# Patient Record
Sex: FEMALE | Race: WHITE | NOT HISPANIC OR LATINO | URBAN - METROPOLITAN AREA
[De-identification: names, ages, dates, MRNs, and addresses within clinical notes are randomized per-mention and may not be internally consistent; named-entity substitution may affect disease eponyms.]

---

## 2020-12-02 ENCOUNTER — HOSPITAL ENCOUNTER (OUTPATIENT)
Dept: URGENT CARE | Facility: CLINIC | Age: 35
Discharge: HOME | End: 2020-12-02
Attending: FAMILY MEDICINE

## 2020-12-04 LAB — BACTERIA UR CULT: NORMAL

## 2021-05-31 ENCOUNTER — HOSPITAL ENCOUNTER (OUTPATIENT)
Dept: URGENT CARE | Facility: CLINIC | Age: 36
Discharge: HOME | End: 2021-05-31
Attending: FAMILY MEDICINE

## 2021-09-13 ENCOUNTER — LAB (OUTPATIENT)
Dept: LAB | Facility: HOSPITAL | Age: 36
End: 2021-09-13

## 2021-09-13 ENCOUNTER — TRANSCRIBE ORDERS (OUTPATIENT)
Dept: LAB | Facility: HOSPITAL | Age: 36
End: 2021-09-13

## 2021-09-13 DIAGNOSIS — Z20.822 COVID-19 RULED OUT: ICD-10-CM

## 2021-09-13 DIAGNOSIS — Z20.822 COVID-19 RULED OUT: Primary | ICD-10-CM

## 2021-09-13 PROCEDURE — C9803 HOPD COVID-19 SPEC COLLECT: HCPCS

## 2021-09-13 PROCEDURE — U0004 COV-19 TEST NON-CDC HGH THRU: HCPCS

## 2021-09-14 LAB — SARS-COV-2 RNA NOSE QL NAA+PROBE: DETECTED

## 2021-09-27 ENCOUNTER — OFFICE VISIT (OUTPATIENT)
Dept: FAMILY MEDICINE CLINIC | Facility: CLINIC | Age: 36
End: 2021-09-27

## 2021-09-27 VITALS
HEART RATE: 80 BPM | SYSTOLIC BLOOD PRESSURE: 122 MMHG | HEIGHT: 66 IN | OXYGEN SATURATION: 100 % | DIASTOLIC BLOOD PRESSURE: 74 MMHG | BODY MASS INDEX: 33.91 KG/M2 | WEIGHT: 211 LBS

## 2021-09-27 DIAGNOSIS — L81.9 PIGMENTED SKIN LESIONS: ICD-10-CM

## 2021-09-27 DIAGNOSIS — R06.02 SHORTNESS OF BREATH: ICD-10-CM

## 2021-09-27 DIAGNOSIS — R05.9 COUGH: ICD-10-CM

## 2021-09-27 DIAGNOSIS — R07.89 OTHER CHEST PAIN: Primary | ICD-10-CM

## 2021-09-27 DIAGNOSIS — U07.1 COVID-19 VIRUS INFECTION: ICD-10-CM

## 2021-09-27 DIAGNOSIS — J18.9 COMMUNITY ACQUIRED PNEUMONIA OF RIGHT LOWER LOBE OF LUNG: ICD-10-CM

## 2021-09-27 PROCEDURE — 99204 OFFICE O/P NEW MOD 45 MIN: CPT | Performed by: FAMILY MEDICINE

## 2021-09-27 PROCEDURE — 93000 ELECTROCARDIOGRAM COMPLETE: CPT | Performed by: FAMILY MEDICINE

## 2021-09-27 RX ORDER — AZITHROMYCIN 250 MG/1
TABLET, FILM COATED ORAL
Qty: 6 TABLET | Refills: 0 | Status: SHIPPED | OUTPATIENT
Start: 2021-09-27 | End: 2021-10-04 | Stop reason: SDUPTHER

## 2021-09-27 RX ORDER — DEXAMETHASONE 4 MG/1
4 TABLET ORAL 2 TIMES DAILY WITH MEALS
Qty: 14 TABLET | Refills: 0 | Status: SHIPPED | OUTPATIENT
Start: 2021-09-27 | End: 2021-10-04 | Stop reason: SDUPTHER

## 2021-09-28 ENCOUNTER — PATIENT ROUNDING (BHMG ONLY) (OUTPATIENT)
Dept: FAMILY MEDICINE CLINIC | Facility: CLINIC | Age: 36
End: 2021-09-28

## 2021-09-28 LAB
ALBUMIN SERPL-MCNC: 4.2 G/DL (ref 3.8–4.8)
ALBUMIN/GLOB SERPL: 1.5 {RATIO} (ref 1.2–2.2)
ALP SERPL-CCNC: 62 IU/L (ref 44–121)
ALT SERPL-CCNC: 7 IU/L (ref 0–32)
AST SERPL-CCNC: 18 IU/L (ref 0–40)
BASOPHILS # BLD AUTO: 0 X10E3/UL (ref 0–0.2)
BASOPHILS NFR BLD AUTO: 1 %
BILIRUB SERPL-MCNC: 0.3 MG/DL (ref 0–1.2)
BUN SERPL-MCNC: 10 MG/DL (ref 6–20)
BUN/CREAT SERPL: 12 (ref 9–23)
CALCIUM SERPL-MCNC: 8.9 MG/DL (ref 8.7–10.2)
CHLORIDE SERPL-SCNC: 103 MMOL/L (ref 96–106)
CO2 SERPL-SCNC: 27 MMOL/L (ref 20–29)
CREAT SERPL-MCNC: 0.85 MG/DL (ref 0.57–1)
CRP SERPL-MCNC: <1 MG/L (ref 0–10)
D DIMER PPP FEU-MCNC: 0.28 MG/L FEU (ref 0–0.49)
EOSINOPHIL # BLD AUTO: 0.1 X10E3/UL (ref 0–0.4)
EOSINOPHIL NFR BLD AUTO: 2 %
ERYTHROCYTE [DISTWIDTH] IN BLOOD BY AUTOMATED COUNT: 11.8 % (ref 11.7–15.4)
ERYTHROCYTE [SEDIMENTATION RATE] IN BLOOD BY WESTERGREN METHOD: 9 MM/HR (ref 0–32)
GLOBULIN SER CALC-MCNC: 2.8 G/DL (ref 1.5–4.5)
GLUCOSE SERPL-MCNC: 98 MG/DL (ref 65–99)
HCT VFR BLD AUTO: 38.3 % (ref 34–46.6)
HGB BLD-MCNC: 13 G/DL (ref 11.1–15.9)
IMM GRANULOCYTES # BLD AUTO: 0 X10E3/UL (ref 0–0.1)
IMM GRANULOCYTES NFR BLD AUTO: 0 %
LYMPHOCYTES # BLD AUTO: 1.7 X10E3/UL (ref 0.7–3.1)
LYMPHOCYTES NFR BLD AUTO: 34 %
MCH RBC QN AUTO: 31.5 PG (ref 26.6–33)
MCHC RBC AUTO-ENTMCNC: 33.9 G/DL (ref 31.5–35.7)
MCV RBC AUTO: 93 FL (ref 79–97)
MONOCYTES # BLD AUTO: 0.5 X10E3/UL (ref 0.1–0.9)
MONOCYTES NFR BLD AUTO: 9 %
NEUTROPHILS # BLD AUTO: 2.8 X10E3/UL (ref 1.4–7)
NEUTROPHILS NFR BLD AUTO: 54 %
PLATELET # BLD AUTO: 258 X10E3/UL (ref 150–450)
POTASSIUM SERPL-SCNC: 4.3 MMOL/L (ref 3.5–5.2)
PROT SERPL-MCNC: 7 G/DL (ref 6–8.5)
RBC # BLD AUTO: 4.13 X10E6/UL (ref 3.77–5.28)
SODIUM SERPL-SCNC: 142 MMOL/L (ref 134–144)
WBC # BLD AUTO: 5.1 X10E3/UL (ref 3.4–10.8)

## 2021-09-28 NOTE — PROGRESS NOTES
September 28, 2021    Hello, may I speak with Petty Harris?    My name is Eri Moise      I am  with [facility]  [address]  [facility] [address]  [phone].    Before we get started may I verify your date of birth? 1985    I am calling to officially welcome you to our practice and ask about your recent visit. Is this a good time to talk? yes    Tell me about your visit with us. What things went well?  Everything went very well. Patient is very happy with Dr. Rios; visit went great. Everyone was very pleasant and caring.       We're always looking for ways to make our patients' experiences even better. Do you have recommendations on ways we may improve?  no    Overall were you satisfied with your first visit to our practice? yes       I appreciate you taking the time to speak with me today. Is there anything else I can do for you? no      Thank you, and have a great day.

## 2021-10-04 DIAGNOSIS — R06.02 SHORTNESS OF BREATH: ICD-10-CM

## 2021-10-04 DIAGNOSIS — J18.9 COMMUNITY ACQUIRED PNEUMONIA OF RIGHT LOWER LOBE OF LUNG: ICD-10-CM

## 2021-10-04 DIAGNOSIS — R07.89 OTHER CHEST PAIN: ICD-10-CM

## 2021-10-04 RX ORDER — AZITHROMYCIN 250 MG/1
TABLET, FILM COATED ORAL
Qty: 6 TABLET | Refills: 0 | Status: SHIPPED | OUTPATIENT
Start: 2021-10-04 | End: 2021-10-04 | Stop reason: SDUPTHER

## 2021-10-04 RX ORDER — DEXAMETHASONE 4 MG/1
4 TABLET ORAL 2 TIMES DAILY WITH MEALS
Qty: 14 TABLET | Refills: 0 | Status: SHIPPED | OUTPATIENT
Start: 2021-10-04 | End: 2022-08-08

## 2021-10-04 RX ORDER — AZITHROMYCIN 250 MG/1
TABLET, FILM COATED ORAL
Qty: 6 TABLET | Refills: 0 | Status: SHIPPED | OUTPATIENT
Start: 2021-10-04 | End: 2022-08-08

## 2022-04-26 ENCOUNTER — TELEPHONE (OUTPATIENT)
Dept: FAMILY MEDICINE CLINIC | Facility: CLINIC | Age: 37
End: 2022-04-26

## 2022-04-26 RX ORDER — AMOXICILLIN 875 MG/1
875 TABLET, COATED ORAL 2 TIMES DAILY
Qty: 20 TABLET | Refills: 0 | Status: SHIPPED | OUTPATIENT
Start: 2022-04-26 | End: 2022-08-08

## 2022-04-30 ENCOUNTER — DOCUMENTATION (OUTPATIENT)
Dept: FAMILY MEDICINE CLINIC | Facility: CLINIC | Age: 37
End: 2022-04-30

## 2022-05-18 ENCOUNTER — HOSPITAL ENCOUNTER (EMERGENCY)
Facility: HOSPITAL | Age: 37
Discharge: HOME | End: 2022-05-18
Attending: EMERGENCY MEDICINE | Admitting: EMERGENCY MEDICINE

## 2022-05-18 ENCOUNTER — APPOINTMENT (OUTPATIENT)
Dept: ULTRASOUND IMAGING | Facility: HOSPITAL | Age: 37
End: 2022-05-18

## 2022-05-18 VITALS
TEMPERATURE: 98.9 F | DIASTOLIC BLOOD PRESSURE: 82 MMHG | OXYGEN SATURATION: 100 % | HEIGHT: 66 IN | RESPIRATION RATE: 16 BRPM | HEART RATE: 82 BPM | SYSTOLIC BLOOD PRESSURE: 124 MMHG | BODY MASS INDEX: 36.17 KG/M2 | WEIGHT: 225.09 LBS

## 2022-05-18 DIAGNOSIS — O26.891 PELVIC PAIN DURING PREGNANCY IN FIRST TRIMESTER, ANTEPARTUM: ICD-10-CM

## 2022-05-18 DIAGNOSIS — N83.201 RIGHT OVARIAN CYST: ICD-10-CM

## 2022-05-18 DIAGNOSIS — Z3A.01 LESS THAN 8 WEEKS GESTATION OF PREGNANCY: Primary | ICD-10-CM

## 2022-05-18 DIAGNOSIS — R10.2 PELVIC PAIN DURING PREGNANCY IN FIRST TRIMESTER, ANTEPARTUM: ICD-10-CM

## 2022-05-18 LAB
ALBUMIN SERPL-MCNC: 4 G/DL (ref 3.5–5.2)
ALBUMIN/GLOB SERPL: 1.3 G/DL
ALP SERPL-CCNC: 66 U/L (ref 39–117)
ALT SERPL W P-5'-P-CCNC: 14 U/L (ref 1–33)
ANION GAP SERPL CALCULATED.3IONS-SCNC: 9.7 MMOL/L (ref 5–15)
AST SERPL-CCNC: 20 U/L (ref 1–32)
BACTERIA UR QL AUTO: ABNORMAL /HPF
BASOPHILS # BLD AUTO: 0.03 10*3/MM3 (ref 0–0.2)
BASOPHILS NFR BLD AUTO: 0.5 % (ref 0–1.5)
BILIRUB SERPL-MCNC: 0.2 MG/DL (ref 0–1.2)
BILIRUB UR QL STRIP: NEGATIVE
BUN SERPL-MCNC: 9 MG/DL (ref 6–20)
BUN/CREAT SERPL: 10.6 (ref 7–25)
CALCIUM SPEC-SCNC: 9.2 MG/DL (ref 8.6–10.5)
CHLORIDE SERPL-SCNC: 103 MMOL/L (ref 98–107)
CLARITY UR: CLEAR
CO2 SERPL-SCNC: 24.3 MMOL/L (ref 22–29)
COLOR UR: YELLOW
CREAT SERPL-MCNC: 0.85 MG/DL (ref 0.57–1)
DEPRECATED RDW RBC AUTO: 43.1 FL (ref 37–54)
EGFRCR SERPLBLD CKD-EPI 2021: 89.5 ML/MIN/1.73
EOSINOPHIL # BLD AUTO: 0.37 10*3/MM3 (ref 0–0.4)
EOSINOPHIL NFR BLD AUTO: 5.8 % (ref 0.3–6.2)
ERYTHROCYTE [DISTWIDTH] IN BLOOD BY AUTOMATED COUNT: 12.5 % (ref 12.3–15.4)
GLOBULIN UR ELPH-MCNC: 3.1 GM/DL
GLUCOSE SERPL-MCNC: 98 MG/DL (ref 65–99)
GLUCOSE UR STRIP-MCNC: NEGATIVE MG/DL
HCG INTACT+B SERPL-ACNC: 322.8 MIU/ML
HCT VFR BLD AUTO: 36.2 % (ref 34–46.6)
HGB BLD-MCNC: 12 G/DL (ref 12–15.9)
HGB UR QL STRIP.AUTO: ABNORMAL
HOLD SPECIMEN: NORMAL
HOLD SPECIMEN: NORMAL
HYALINE CASTS UR QL AUTO: ABNORMAL /LPF
IMM GRANULOCYTES # BLD AUTO: 0.01 10*3/MM3 (ref 0–0.05)
IMM GRANULOCYTES NFR BLD AUTO: 0.2 % (ref 0–0.5)
KETONES UR QL STRIP: ABNORMAL
LEUKOCYTE ESTERASE UR QL STRIP.AUTO: NEGATIVE
LIPASE SERPL-CCNC: 19 U/L (ref 13–60)
LYMPHOCYTES # BLD AUTO: 1.99 10*3/MM3 (ref 0.7–3.1)
LYMPHOCYTES NFR BLD AUTO: 31.1 % (ref 19.6–45.3)
MCH RBC QN AUTO: 31.4 PG (ref 26.6–33)
MCHC RBC AUTO-ENTMCNC: 33.1 G/DL (ref 31.5–35.7)
MCV RBC AUTO: 94.8 FL (ref 79–97)
MONOCYTES # BLD AUTO: 0.72 10*3/MM3 (ref 0.1–0.9)
MONOCYTES NFR BLD AUTO: 11.3 % (ref 5–12)
MUCOUS THREADS URNS QL MICRO: ABNORMAL /HPF
NEUTROPHILS NFR BLD AUTO: 3.27 10*3/MM3 (ref 1.7–7)
NEUTROPHILS NFR BLD AUTO: 51.1 % (ref 42.7–76)
NITRITE UR QL STRIP: NEGATIVE
NRBC BLD AUTO-RTO: 0 /100 WBC (ref 0–0.2)
PH UR STRIP.AUTO: 6.5 [PH] (ref 5–8)
PLATELET # BLD AUTO: 290 10*3/MM3 (ref 140–450)
PMV BLD AUTO: 11.5 FL (ref 6–12)
POTASSIUM SERPL-SCNC: 4.2 MMOL/L (ref 3.5–5.2)
PROT SERPL-MCNC: 7.1 G/DL (ref 6–8.5)
PROT UR QL STRIP: ABNORMAL
RBC # BLD AUTO: 3.82 10*6/MM3 (ref 3.77–5.28)
RBC # UR STRIP: ABNORMAL /HPF
REF LAB TEST METHOD: ABNORMAL
SODIUM SERPL-SCNC: 137 MMOL/L (ref 136–145)
SP GR UR STRIP: >1.03 (ref 1–1.03)
SQUAMOUS #/AREA URNS HPF: ABNORMAL /HPF
UROBILINOGEN UR QL STRIP: ABNORMAL
WBC # UR STRIP: ABNORMAL /HPF
WBC NRBC COR # BLD: 6.39 10*3/MM3 (ref 3.4–10.8)
WHOLE BLOOD HOLD COAG: NORMAL
WHOLE BLOOD HOLD SPECIMEN: NORMAL

## 2022-05-18 PROCEDURE — 76817 TRANSVAGINAL US OBSTETRIC: CPT

## 2022-05-18 PROCEDURE — 80053 COMPREHEN METABOLIC PANEL: CPT | Performed by: EMERGENCY MEDICINE

## 2022-05-18 PROCEDURE — 84702 CHORIONIC GONADOTROPIN TEST: CPT | Performed by: EMERGENCY MEDICINE

## 2022-05-18 PROCEDURE — 85025 COMPLETE CBC W/AUTO DIFF WBC: CPT | Performed by: EMERGENCY MEDICINE

## 2022-05-18 PROCEDURE — 83690 ASSAY OF LIPASE: CPT | Performed by: EMERGENCY MEDICINE

## 2022-05-18 PROCEDURE — 99283 EMERGENCY DEPT VISIT LOW MDM: CPT

## 2022-05-18 PROCEDURE — 81001 URINALYSIS AUTO W/SCOPE: CPT | Performed by: EMERGENCY MEDICINE

## 2022-05-18 RX ORDER — SODIUM CHLORIDE 0.9 % (FLUSH) 0.9 %
10 SYRINGE (ML) INJECTION AS NEEDED
Status: DISCONTINUED | OUTPATIENT
Start: 2022-05-18 | End: 2022-05-18 | Stop reason: HOSPADM

## 2022-05-18 NOTE — ED PROVIDER NOTES
Subjective   The patient presents to the emergency department stating that 4 years ago she had a left-sided ectopic pregnancy for which they were able to repair her left-sided tube.  She states that she just found out on Monday that she was pregnant.  She states that Friday she started having a constant lower pelvic cramping that she states is worse on the left and has been constant but does worsen at times.  She states that Friday she also had some light spotting but it was time for her to start her menstrual cycle.  She states that had that has since stopped.  She states that she has had no recent fevers.  She denies any urinary symptoms.  She denies any back pain.  She states that she has had seasonal allergies but no upper respiratory symptoms.  She is a G4 para 2 and has had 2 previous C-sections and 1 ectopic pregnancy in the past.  On exam her abdomen is soft and no significant tenderness with palpation.  She denies any recent falls or trauma.          Review of Systems   Constitutional: Negative for chills and fever.   HENT: Negative for congestion, ear pain and sore throat.    Eyes: Negative for pain.   Respiratory: Negative for cough, chest tightness and shortness of breath.    Cardiovascular: Negative for chest pain.   Gastrointestinal: Negative for abdominal pain, diarrhea, nausea and vomiting.   Genitourinary: Positive for flank pain. Negative for dysuria, frequency, hematuria, urgency, vaginal bleeding and vaginal discharge.   Musculoskeletal: Negative for back pain, joint swelling and neck pain.   Skin: Negative for pallor and rash.   Neurological: Negative for seizures and headaches.   All other systems reviewed and are negative.      Past Medical History:   Diagnosis Date   • Ectopic pregnancy of left ovary        No Known Allergies    Past Surgical History:   Procedure Laterality Date   • BREAST AUGMENTATION Bilateral    •  SECTION     • ECTOPIC PREGNANCY SURGERY Left        History  reviewed. No pertinent family history.    Social History     Socioeconomic History   • Marital status:    Tobacco Use   • Smoking status: Never Smoker   Substance and Sexual Activity   • Alcohol use: Never   • Drug use: Never           Objective   Physical Exam  Vitals and nursing note reviewed.   Constitutional:       General: She is not in acute distress.     Appearance: Normal appearance. She is well-developed. She is not ill-appearing or toxic-appearing.   HENT:      Head: Normocephalic and atraumatic.      Mouth/Throat:      Mouth: Mucous membranes are moist.   Eyes:      General: No scleral icterus.  Cardiovascular:      Rate and Rhythm: Normal rate and regular rhythm.      Pulses: Normal pulses.   Pulmonary:      Effort: Pulmonary effort is normal. No respiratory distress.      Breath sounds: Normal breath sounds.   Abdominal:      General: Abdomen is flat.      Palpations: Abdomen is soft.      Tenderness: There is no abdominal tenderness. There is no guarding or rebound.   Musculoskeletal:         General: Normal range of motion.      Cervical back: Normal range of motion and neck supple.   Skin:     General: Skin is warm and dry.      Capillary Refill: Capillary refill takes less than 2 seconds.   Neurological:      General: No focal deficit present.      Mental Status: She is alert and oriented to person, place, and time. Mental status is at baseline.   Psychiatric:         Mood and Affect: Mood normal.         Behavior: Behavior normal.         Procedures           ED Course  ED Course as of 05/18/22 2105   Wed May 18, 2022   2104 I reviewed the patient's test results with her and her .  We discussed the need for her to follow-up with an OB/GYN so that they may do repeat ultrasounds to ensure that she has an intrauterine pregnancy.  She verbalized understanding of follow-up instructions.  She requested several numbers of area OB doctors so that she may find one to follow-up with.  She also  verbalized understanding return instructions to the ED. [TC]      ED Course User Index  [TC] Odessa Rogel, NATTY                                                 MDM  Number of Diagnoses or Management Options  Less than 8 weeks gestation of pregnancy: minor  Pelvic pain during pregnancy in first trimester, antepartum: minor  Right ovarian cyst: minor     Amount and/or Complexity of Data Reviewed  Clinical lab tests: reviewed  Tests in the radiology section of CPT®: reviewed    Risk of Complications, Morbidity, and/or Mortality  Presenting problems: low  Diagnostic procedures: low  Management options: low    Patient Progress  Patient progress: stable      Final diagnoses:   Less than 8 weeks gestation of pregnancy   Right ovarian cyst   Pelvic pain during pregnancy in first trimester, antepartum       ED Disposition  ED Disposition     ED Disposition   Discharge    Condition   Stable    Comment   --             Saran Rios DO  [address]  [facility] [id number] [phone]      As needed    Mavis Torrez MD  [address]    [place] [address]  [phone]          Logan Freeman MD  [address] [place] [address]  [phone]          Juice Carrillo MD  [address]    [facility] [id number] [phone]               Medication List      No changes were made to your prescriptions during this visit.          Odessa Rogel APRN  05/18/22 3062

## 2022-05-19 NOTE — DISCHARGE INSTRUCTIONS
Rest, drink plenty of fluids.  You may take over-the-counter acetaminophen as needed for aches pains and fever.  Either follow-up with Dr. Lai or with one of the OB/GYN's listed for further evaluation and treatment and repeat ultrasounds to ensure you have an intrauterine pregnancy.  Return to the emergency department immediately for any acutely worsening and persistent pelvic pain, any vaginal bleeding greater than 1 saturated pad per hour, or any new or worse concerns.

## 2022-08-08 ENCOUNTER — TELEPHONE (OUTPATIENT)
Dept: FAMILY MEDICINE CLINIC | Facility: CLINIC | Age: 37
End: 2022-08-08

## 2022-08-08 DIAGNOSIS — U07.1 COVID-19 VIRUS INFECTION: Primary | ICD-10-CM

## 2022-08-08 NOTE — TELEPHONE ENCOUNTER
Pt tested positive for COVID yesterday. She is asking for you to write her a letter for work. Her symptoms started Saturday night (8/6)

## 2022-08-08 NOTE — TELEPHONE ENCOUNTER
Letter ready and printed.  She is candidate for antiviral and I sent in lagevrio to [facility].  YVES

## 2022-08-25 ENCOUNTER — TELEPHONE (OUTPATIENT)
Dept: FAMILY MEDICINE CLINIC | Facility: CLINIC | Age: 37
End: 2022-08-25

## 2022-08-25 RX ORDER — CEPHALEXIN 500 MG/1
500 CAPSULE ORAL 2 TIMES DAILY
Qty: 14 CAPSULE | Refills: 0 | Status: SHIPPED | OUTPATIENT
Start: 2022-08-25 | End: 2023-02-27

## 2023-01-24 RX ORDER — CHLORCYCLIZINE HYDROCHLORIDE AND PSEUDOEPHEDRINE HYDROCHLORIDE 25; 60 MG/1; MG/1
TABLET ORAL
Qty: 42 TABLET | Refills: 0 | OUTPATIENT
Start: 2023-01-24

## 2023-02-27 ENCOUNTER — OFFICE VISIT (OUTPATIENT)
Dept: FAMILY MEDICINE CLINIC | Facility: CLINIC | Age: 38
End: 2023-02-27
Payer: COMMERCIAL

## 2023-02-27 VITALS
HEART RATE: 83 BPM | HEIGHT: 66 IN | WEIGHT: 228 LBS | DIASTOLIC BLOOD PRESSURE: 84 MMHG | OXYGEN SATURATION: 99 % | SYSTOLIC BLOOD PRESSURE: 120 MMHG | BODY MASS INDEX: 36.64 KG/M2

## 2023-02-27 DIAGNOSIS — Z00.00 WELLNESS EXAMINATION: Primary | ICD-10-CM

## 2023-02-27 DIAGNOSIS — E66.9 OBESITY (BMI 30-39.9): ICD-10-CM

## 2023-02-27 DIAGNOSIS — E78.5 DYSLIPIDEMIA: ICD-10-CM

## 2023-02-27 PROCEDURE — 99396 PREV VISIT EST AGE 40-64: CPT | Performed by: FAMILY MEDICINE

## 2023-02-27 RX ORDER — SCOLOPAMINE TRANSDERMAL SYSTEM 1 MG/1
1 PATCH, EXTENDED RELEASE TRANSDERMAL
Qty: 4 PATCH | Refills: 3 | Status: SHIPPED | OUTPATIENT
Start: 2023-02-27

## 2023-02-27 NOTE — PROGRESS NOTES
"Subjective   Petty Harris is a 38 y.o. female. Presents today for   Chief Complaint   Patient presents with   • Annual Exam       History of Present Illness  Patient here wellness exam;  Doing well;  No cp/soa;  Due for lipid check;   Had pap last year.     Review of Systems   Respiratory: Negative for shortness of breath.    Cardiovascular: Negative for chest pain, palpitations and leg swelling.   Gastrointestinal: Negative for abdominal pain.       There is no problem list on file for this patient.      Social History     Socioeconomic History   • Marital status:    Tobacco Use   • Smoking status: Never   • Smokeless tobacco: Never   Substance and Sexual Activity   • Alcohol use: Never   • Drug use: Never       No Known Allergies    Current Outpatient Medications on File Prior to Visit   Medication Sig Dispense Refill   • [DISCONTINUED] cephalexin (KEFLEX) 500 MG capsule Take 1 capsule by mouth 2 (Two) Times a Day. 14 capsule 0   • [DISCONTINUED] Chlorcyclizine-Pseudoephed 25-60 MG tablet 1 po tid prn congestion 42 tablet 0   • [DISCONTINUED] nystatin (MYCOSTATIN) 100,000 unit/mL suspension 5 ml Swish and spit 4 times daily 400 mL 0     No current facility-administered medications on file prior to visit.       Objective   Vitals:    02/27/23 1359   BP: 120/84   Pulse: 83   SpO2: 99%   Weight: 103 kg (228 lb)   Height: 167.6 cm (66\")     Body mass index is 36.8 kg/m².    Physical Exam  Vitals and nursing note reviewed.   Constitutional:       Appearance: She is well-developed.   HENT:      Head: Normocephalic and atraumatic.   Neck:      Thyroid: No thyromegaly.      Vascular: No JVD.   Cardiovascular:      Rate and Rhythm: Normal rate and regular rhythm.      Heart sounds: Normal heart sounds. No murmur heard.    No friction rub. No gallop.   Pulmonary:      Effort: Pulmonary effort is normal. No respiratory distress.      Breath sounds: Normal breath sounds. No wheezing or rales.   Abdominal:      " General: Bowel sounds are normal. There is no distension.      Palpations: Abdomen is soft.      Tenderness: There is no abdominal tenderness. There is no guarding or rebound.   Musculoskeletal:      Cervical back: Neck supple.   Skin:     General: Skin is warm and dry.   Neurological:      Mental Status: She is alert.   Psychiatric:         Behavior: Behavior normal.         Assessment & Plan   Diagnoses and all orders for this visit:    1. Wellness examination (Primary)    2. Obesity (BMI 30-39.9)    3. Dyslipidemia  -     Comprehensive Metabolic Panel  -     Lipid Panel    Other orders  -     Scopolamine 1 MG/3DAYS patch; Place 1 patch on the skin as directed by provider Every 72 (Seventy-Two) Hours.  Dispense: 4 patch; Refill: 3      Counseled on diet and exercise  Cholesterol screening  Ok scopolamine patches for motion sickness       -Follow up: 12 motnhs and prn

## 2023-02-28 LAB
ALBUMIN SERPL-MCNC: 4 G/DL (ref 3.8–4.8)
ALBUMIN/GLOB SERPL: 1.7 {RATIO} (ref 1.2–2.2)
ALP SERPL-CCNC: 61 IU/L (ref 44–121)
ALT SERPL-CCNC: 11 IU/L (ref 0–32)
AST SERPL-CCNC: 20 IU/L (ref 0–40)
BILIRUB SERPL-MCNC: 0.2 MG/DL (ref 0–1.2)
BUN SERPL-MCNC: 9 MG/DL (ref 6–24)
BUN/CREAT SERPL: 11 (ref 9–23)
CALCIUM SERPL-MCNC: 8.8 MG/DL (ref 8.7–10.2)
CHLORIDE SERPL-SCNC: 106 MMOL/L (ref 96–106)
CHOLEST SERPL-MCNC: 160 MG/DL (ref 100–199)
CO2 SERPL-SCNC: 24 MMOL/L (ref 20–29)
CREAT SERPL-MCNC: 0.8 MG/DL (ref 0.57–1)
EGFRCR SERPLBLD CKD-EPI 2021: 95 ML/MIN/1.73
GLOBULIN SER CALC-MCNC: 2.4 G/DL (ref 1.5–4.5)
GLUCOSE SERPL-MCNC: 85 MG/DL (ref 70–99)
HDLC SERPL-MCNC: 58 MG/DL
LDLC SERPL CALC-MCNC: 92 MG/DL (ref 0–99)
POTASSIUM SERPL-SCNC: 4.3 MMOL/L (ref 3.5–5.2)
PROT SERPL-MCNC: 6.4 G/DL (ref 6–8.5)
SODIUM SERPL-SCNC: 141 MMOL/L (ref 134–144)
TRIGL SERPL-MCNC: 49 MG/DL (ref 0–149)
VLDLC SERPL CALC-MCNC: 10 MG/DL (ref 5–40)

## 2023-04-13 RX ORDER — TIZANIDINE 4 MG/1
4 TABLET ORAL EVERY 8 HOURS PRN
Qty: 45 TABLET | Refills: 2 | Status: SHIPPED | OUTPATIENT
Start: 2023-04-13

## 2023-04-19 ENCOUNTER — TELEPHONE (OUTPATIENT)
Dept: FAMILY MEDICINE CLINIC | Facility: CLINIC | Age: 38
End: 2023-04-19
Payer: COMMERCIAL

## 2023-04-19 RX ORDER — FLUCONAZOLE 150 MG/1
150 TABLET ORAL ONCE
Qty: 1 TABLET | Refills: 0 | Status: SHIPPED | OUTPATIENT
Start: 2023-04-19 | End: 2023-04-19

## 2023-04-19 RX ORDER — CEPHALEXIN 500 MG/1
500 CAPSULE ORAL 2 TIMES DAILY
Qty: 14 CAPSULE | Refills: 0 | Status: SHIPPED | OUTPATIENT
Start: 2023-04-19

## 2023-04-21 RX ORDER — FLUCONAZOLE 150 MG/1
150 TABLET ORAL ONCE
Qty: 2 TABLET | Refills: 0 | Status: SHIPPED | OUTPATIENT
Start: 2023-04-21 | End: 2023-04-21

## 2023-05-18 RX ORDER — AZITHROMYCIN 250 MG/1
TABLET, FILM COATED ORAL
Qty: 6 TABLET | Refills: 0 | Status: SHIPPED | OUTPATIENT
Start: 2023-05-18

## 2023-08-30 ENCOUNTER — TELEPHONE (OUTPATIENT)
Dept: FAMILY MEDICINE CLINIC | Facility: CLINIC | Age: 38
End: 2023-08-30
Payer: COMMERCIAL

## 2023-08-30 RX ORDER — AZITHROMYCIN 250 MG/1
TABLET, FILM COATED ORAL
Qty: 6 TABLET | Refills: 0 | Status: SHIPPED | OUTPATIENT
Start: 2023-08-30

## 2023-08-30 NOTE — TELEPHONE ENCOUNTER
Colonoscopy scheduled per Dr. Roman's protocol and instructions reviewed, pt verbalized understanding. The patient has been instructed to hold certain medications prior to this procedure and to check with prescribing provider to make sure it would be ok to hold as stated in instructions listed in letter tab.      Diagnosis code from O/A order:  Colon Cancer Screening      Instructions mailed to patient      Med sent

## 2023-08-30 NOTE — TELEPHONE ENCOUNTER
PT is requesting a Z Pack for a sinus infection. She has had bad congestion, sore throat, and a headache.     Please advise.

## 2024-04-09 ENCOUNTER — TRANSCRIBE ORDERS (OUTPATIENT)
Dept: ADMINISTRATIVE | Facility: HOSPITAL | Age: 39
End: 2024-04-09
Payer: COMMERCIAL

## 2024-04-09 DIAGNOSIS — Z12.31 SCREENING MAMMOGRAM FOR HIGH-RISK PATIENT: Primary | ICD-10-CM

## 2025-02-12 ENCOUNTER — TELEMEDICINE (OUTPATIENT)
Dept: FAMILY MEDICINE CLINIC | Facility: CLINIC | Age: 40
End: 2025-02-12
Payer: COMMERCIAL

## 2025-02-12 DIAGNOSIS — L70.0 ACNE VULGARIS: Primary | ICD-10-CM

## 2025-02-12 PROCEDURE — 99213 OFFICE O/P EST LOW 20 MIN: CPT | Performed by: FAMILY MEDICINE

## 2025-02-12 RX ORDER — DOXYCYCLINE HYCLATE 50 MG/1
50 CAPSULE ORAL 2 TIMES DAILY
Qty: 90 CAPSULE | Refills: 1 | Status: SHIPPED | OUTPATIENT
Start: 2025-02-12

## 2025-02-12 RX ORDER — ERYTHROMYCIN AND BENZOYL PEROXIDE 30; 50 MG/G; MG/G
GEL TOPICAL
Qty: 46.6 G | Refills: 12 | Status: SHIPPED | OUTPATIENT
Start: 2025-02-12

## 2025-02-12 NOTE — PROGRESS NOTES
Subjective   Petty Harris is a 40 y.o. female. Presents today for   Chief Complaint   Patient presents with    Acne        Patient VIDEO VISIT, she gave consent to treat.    Patient in private room at work;  I was in my office.  Acne  Symptoms include rash.      Patient 41 y/o female reports having cyclical break outs painfufl cystic acne worse around her periods.   OTC not help ing much;      Review of Systems   Skin:  Positive for rash.       There is no problem list on file for this patient.      Social History     Socioeconomic History    Marital status:    Tobacco Use    Smoking status: Never    Smokeless tobacco: Never   Substance and Sexual Activity    Alcohol use: Never    Drug use: Never       No Known Allergies      Objective   There were no vitals filed for this visit.  There is no height or weight on file to calculate BMI.    Physical Exam  Pulmonary:      Effort: No respiratory distress.   Psychiatric:         Behavior: Behavior normal.         Thought Content: Thought content normal.         Judgment: Judgment normal.         Assessment & Plan   Diagnoses and all orders for this visit:    1. Acne vulgaris (Primary)  -     doxycycline (VIBRAMYCIN) 50 MG capsule; Take 1 capsule by mouth 2 (Two) Times a Day.  Dispense: 90 capsule; Refill: 1  -     benzoyl peroxide-erythromycin (Benzamycin) 5-3 % gel; Apply thin layer over face nightly;  May apply morning as needed when breakouts  Dispense: 46.6 g; Refill: 12    Will give topical to use nightly and then use am too if breakouts;  Also give low dose doxy to suppress;  to message if no relief, consider adding spironolactone                -Follow up: Prn - RTC if worse or no improvement.      ________________________________________  Saran Rios DO, MS    Current Outpatient Medications on File Prior to Visit   Medication Sig Dispense Refill    azithromycin (Zithromax Z-Ortiz) 250 MG tablet Take 2 tablets by mouth on day 1, then 1 tablet  daily on days 2-5 6 tablet 0    cephalexin (Keflex) 500 MG capsule Take 1 capsule by mouth 2 (Two) Times a Day. 14 capsule 0    Scopolamine 1 MG/3DAYS patch Place 1 patch on the skin as directed by provider Every 72 (Seventy-Two) Hours. 4 patch 3    tiZANidine (ZANAFLEX) 4 MG tablet Take 1 tablet by mouth Every 8 (Eight) Hours As Needed for Muscle Spasms. 45 tablet 2     No current facility-administered medications on file prior to visit.

## 2025-04-01 ENCOUNTER — OFFICE VISIT (OUTPATIENT)
Age: 40
End: 2025-04-01
Payer: COMMERCIAL

## 2025-04-01 VITALS
HEART RATE: 63 BPM | BODY MASS INDEX: 25.01 KG/M2 | OXYGEN SATURATION: 95 % | DIASTOLIC BLOOD PRESSURE: 76 MMHG | SYSTOLIC BLOOD PRESSURE: 121 MMHG | HEIGHT: 66 IN | WEIGHT: 155.6 LBS

## 2025-04-01 DIAGNOSIS — F11.21 OPIOID DEPENDENCE IN REMISSION: Primary | ICD-10-CM

## 2025-04-01 RX ORDER — BUPRENORPHINE HYDROCHLORIDE AND NALOXONE HYDROCHLORIDE DIHYDRATE 8; 2 MG/1; MG/1
1.5 TABLET SUBLINGUAL DAILY
Qty: 45 TABLET | Refills: 2 | Status: SHIPPED | OUTPATIENT
Start: 2025-04-01

## 2025-04-01 RX ORDER — BUPRENORPHINE HYDROCHLORIDE AND NALOXONE HYDROCHLORIDE DIHYDRATE 8; 2 MG/1; MG/1
1.5 TABLET SUBLINGUAL DAILY
COMMUNITY
Start: 2025-03-11 | End: 2025-04-01 | Stop reason: SDUPTHER

## 2025-04-01 NOTE — PROGRESS NOTES
Office  Note     Patient Name: Petty Harris  : 1985   MRN: [id number]     Referring Provider: Saran Rios DO    Chief Complaint: Substance use    History of Present Illness:   HPI    Petty Harris is a 40 y.o. female who is here today for follow up related to her opioid use disorder.  She has been taking her 12 mg of Suboxone every day for 10 years.  She is technically never relapse because of her addiction.  About 6 years ago there was a short period of time where she was sharing her Suboxone with her  who is also on Suboxone because he was actually on too low of a dose every day and she felt sorry for him which means she had less than usual.  Technically that was misuse of the medication but not because she was give again the any craving.    For the last 6 years she and her  have both been compliant as patients of hours.  They have used their Suboxone compliantly.  Indeed she never has any craving.  She is not using any other potentially controlled substance like alcohol or marijuana.  Also she never looks forward to taking the medicine nor does she resent being on it.  In addition she does not have any other major issues that are stressful going on.  She likes her job she and her  make good money they are paying down there that they get to do what they want to do.  Their 2 kids are both healthy and staying out of trouble.  She has reached the stage where she could come off of her buprenorphine but it is fitting into her daily routine like brushing her teeth so smoothly and simply she sees no reason to rock the boat and take any chance on coming off the buprenorphine much less even going through the struggle of withdrawal.  She is not interested.  Ironically this is common that at the time the patient's could come off the buprenorphine it is really not worth the effort.  For her part we will not recommend she ever come off of it.  She is welcome to stay on it as  long she wants as long as she complies with the program which means she needs to set up appointment with her therapist ASAP.      Triggers: None    Cravings: None    Relapse Prevention: See the therapist ASAP    UDS Confirmation: UDS negative    LELA (PDMP) Reviewed for Current/Active Medications      Past Surgical History:  Past Surgical History:   Procedure Laterality Date    BREAST AUGMENTATION Bilateral      SECTION      x2    ECTOPIC PREGNANCY SURGERY Left        Problem List:  There is no problem list on file for this patient.      Allergy:   No Known Allergies     Current Medications:   Current Outpatient Medications   Medication Sig Dispense Refill    buprenorphine-naloxone (SUBOXONE) 8-2 MG per SL tablet Place 1.5 tablets under the tongue Daily. 45 tablet 2    benzoyl peroxide-erythromycin (Benzamycin) 5-3 % gel Apply thin layer over face nightly;  May apply morning as needed when breakouts (Patient not taking: Reported on 2025) 46.6 g 12    doxycycline (VIBRAMYCIN) 50 MG capsule Take 1 capsule by mouth 2 (Two) Times a Day. (Patient not taking: Reported on 2025) 90 capsule 1     No current facility-administered medications for this visit.       Past Medical History:  Past Medical History:   Diagnosis Date    Ectopic pregnancy of left ovary     Substance abuse        Social History:  Social History     Socioeconomic History    Marital status:    Tobacco Use    Smoking status: Never    Smokeless tobacco: Never   Vaping Use    Vaping status: Never Used   Substance and Sexual Activity    Alcohol use: Never    Drug use: Not Currently       Social History     Social History Narrative    /Social History:    Social Support: No    Residence: living setting house with family    Current Employment: yes    Education: High School Diploma    Learning Disabilities: No    Legal history: No    Hobbies/interests: Full time Employed    Moravian: Sikhism     Exercise: No    Dietary issues: No     Sleep issues: No    Caffeine intake: 2 Energy drinks      history: No         Family History:  History reviewed. No pertinent family history.      Subjective      Review of Systems:   Review of Systems    PHQ-9 Score:       UDAY-7 Score:   Feeling nervous, anxious or on edge: Not at all  Not being able to stop or control worrying: Not at all  Worrying too much about different things: Not at all  Trouble Relaxing: Not at all  Being so restless that it is hard to sit still: Not at all  Feeling afraid as if something awful might happen: Not at all  Becoming easily annoyed or irritable: Not at all  UDAY 7 Total Score: 0  If you checked any problems, how difficult have these problems made it for you to do your work, take care of things at home, or get along with other people: Not difficult at all    Patient History:   The following portions of the patient's history were reviewed and updated as appropriate: allergies, current medications, past family history, past medical history, past social history, past surgical history and problem list.     Social:  Social History     Socioeconomic History    Marital status:    Tobacco Use    Smoking status: Never    Smokeless tobacco: Never   Vaping Use    Vaping status: Never Used   Substance and Sexual Activity    Alcohol use: Never    Drug use: Not Currently       Medications:     Current Outpatient Medications:     buprenorphine-naloxone (SUBOXONE) 8-2 MG per SL tablet, Place 1.5 tablets under the tongue Daily., Disp: 45 tablet, Rfl: 2    benzoyl peroxide-erythromycin (Benzamycin) 5-3 % gel, Apply thin layer over face nightly;  May apply morning as needed when breakouts (Patient not taking: Reported on 4/1/2025), Disp: 46.6 g, Rfl: 12    doxycycline (VIBRAMYCIN) 50 MG capsule, Take 1 capsule by mouth 2 (Two) Times a Day. (Patient not taking: Reported on 4/1/2025), Disp: 90 capsule, Rfl: 1    Objective     Physical Exam:  Physical Exam    Vital Signs:   Vitals:     "04/01/25 1331   BP: 121/76   Pulse: 63   SpO2: 95%   Weight: 70.6 kg (155 lb 9.6 oz)   Height: 167.6 cm (66\")            Body mass index is 25.11 kg/m².     MENTAL STATUS EXAM   General Appearance:  Cleanly groomed and dressed  Eye Contact:  Good eye contact  Attitude:  Cooperative  Motor Activity:  Normal gait, posture  Muscle Strength:  Normal  Speech:  Normal rate, tone, volume  Language:  Spontaneous  Mood and affect:  Normal, pleasant  Hopelessness:  Denies  Loneliness: Denies  Thought Process:  Logical  Associations/ Thought Content:  No delusions  Hallucinations:  None  Suicidal Ideations:  Not present  Homicidal Ideation:  Not present          Assessment / Plan      Diagnoses and all orders for this visit:    1. Opioid dependence in remission (Primary)  -     buprenorphine-naloxone (SUBOXONE) 8-2 MG per SL tablet; Place 1.5 tablets under the tongue Daily.  Dispense: 45 tablet; Refill: 2           PLAN:  Safety: No acute safety concerns  Risk Assessment: Risk of self-harm acutely is low. Risk of self-harm chronically is also low, but could be further elevated in the event of treatment noncompliance and/or AODA.    TREATMENT PLAN/GOALS: Continue supportive psychotherapy efforts and medications as indicated. Treatment and medication options discussed during today's visit. Patient acknowledged and verbally consented to continue with current treatment plan and was educated on the importance of compliance with treatment and follow-up appointments.    MEDICATION ISSUES:  LELA reviewed as expected.  Discussed medication options and treatment plan of prescribed medication as well as the risks, benefits, and side effects including potential falls, possible impaired driving and metabolic adversities among others. Patient is agreeable to call the office with any worsening of symptoms or onset of side effects. Patient is agreeable to call 911 or go to the nearest ER should he/she begin having SI/HI. No medication side " effects or related complaints today.     Return in about 3 months (around 7/1/2025), or Next OV with MD can be telehealth but therapy appt needed ASAP..             This document has been electronically signed by Slim Ro MD  April 1, 2025 14:38 EDT      Part of this note may be an electronic transcription/translation of spoken language to printed text using the Dragon Dictation System.

## 2025-04-16 DIAGNOSIS — F11.21 OPIOID DEPENDENCE IN REMISSION: ICD-10-CM

## 2025-05-12 ENCOUNTER — OFFICE VISIT (OUTPATIENT)
Age: 40
End: 2025-05-12
Payer: COMMERCIAL

## 2025-05-12 ENCOUNTER — TELEPHONE (OUTPATIENT)
Dept: FAMILY MEDICINE CLINIC | Facility: CLINIC | Age: 40
End: 2025-05-12
Payer: COMMERCIAL

## 2025-05-12 DIAGNOSIS — F11.21 OPIOID DEPENDENCE IN REMISSION: Primary | ICD-10-CM

## 2025-05-12 PROCEDURE — 90791 PSYCH DIAGNOSTIC EVALUATION: CPT | Performed by: COUNSELOR

## 2025-05-12 NOTE — TELEPHONE ENCOUNTER
Caller: Petty Harris    Relationship: Self    Best call back number: [phone]    What medication are you requesting: ANTIBIOTIC    What are your current symptoms: NASAL CONGESTION, SORE THROAT, ALLERGIES TURNED INTO SINUS INFECTION    How long have you been experiencing symptoms: ALLERGIES ISSUES FOR 2 WEEKS, SINUS ISSUES THE PAST FEW DAYS    Have you had these symptoms before:    [x] Yes  [] No    Have you been treated for these symptoms before:   [x] Yes  [] No    If a prescription is needed, what is your preferred pharmacy and phone number: [id number] - [place], [place] - [address] AT [facility] [phone] [facility] [phone]      Additional notes: PATIENT REQUEST ANTIBIOTIC

## 2025-05-12 NOTE — PROGRESS NOTES
Time In: 2:00 PM  Time out: 2:36 PM  Name of PCP: Saran Rios DO   Referral source: Dr. Slim Ro    Subjective   Petty Harris is a 40 y.o. female who presents today for initial evaluation      Chief Complaint: Substance abuse       History of Present Illness:     History of Present Illness  The patient is a 40-year-old female being seen today for an initial evaluation.  She has been under the care of Dr. Foster for an extended period, with appointments scheduled every 3 months. She has been on Suboxone for approximately 10 to 12 years, initially sharing her 's prescription due to financial constraints.  Her first episode of treatment began with jennifer crump in the outpatient program where she received both group and individual counseling sessions in conjunction with [facility].  She reports no recent drug use and has not sought treatment elsewhere. Her substance use history began with hydrocodone, escalating from there. She does not smoke or consume alcohol but admits to experimenting with various substances during her teenage years without developing any dependencies. Her introduction to pain pills occurred in her 20s, facilitated by her , who is also in recovery.  She recalls experiencing nausea upon first use but continued due to the perceived benefits in managing her relationship with her . She has never used intravenous drugs and does not currently use nicotine, THC or alcohol. She has been on a stable dose of 1.5 Suboxone daily for some time. She has never overdosed or required Narcan. She has been employed at a local car dealership since 2019, previously working as a manager at [employer]. She completed high school and identifies as Mormon, although she does not regularly attend [employer]. She has no  history and has only received speeding tickets. She has no history of depression, suicidal ideation, or attempts. She reports no current health issues and identifies her   and mother as her primary support system. She has not participated in AA or 12-step recovery programs and reports no difficulties in social interactions. She maintains an active 's license and has no history of DUIs.    Social History:  -  for 24 years  - Two children, ages 21 and 22  - Employed since 2019 at a car dealership  - Previous employment as a manager at [employer]  - High school graduate  - Identifies as Cheondoism   Substance Use:  - Hydrocodone use began in her 20s  - Shared Suboxone prescription with  initially  - No smoking or alcohol use  - Experimented with substances in teenage years without dependency  - Never used intravenous drugs  - Currently on 1.5 Suboxone daily    Pertinent Negatives:  - No history of depression  - No suicidal ideation or attempts  - No overdoses or Narcan administration  - No current health issues  - No difficulties in social interactions  - No history of DUIs      SOCIAL HISTORY  She does not smoke or drink alcohol. She has been  for 24 years and has 2 children, aged 21 and 22.    FAMILY HISTORY  Her brother and father both had addiction issues and passed away within the last 5 years.       Patient adamantly and convincingly denies current suicidal or homicidal ideation or perceptual disturbance.     Childhood Experiences:   Has patient experienced a major accident or tragic events as a child? no     Has patient experienced any other significant life events or trauma (such as verbal, physical, sexual abuse)? no    Significant Life Events:  Has patient been through or witnessed a divorce? Yes, patients parents  at the age of 9.    Has patient experienced a death / loss of relationship? yes    Has patient experienced a major accident or tragic events? no     Has patient experienced any other significant life events or trauma (such as verbal, physical, sexual abuse)? no    Social History:   Social History     Socioeconomic History     Marital status:    Tobacco Use    Smoking status: Never    Smokeless tobacco: Never   Vaping Use    Vaping status: Never Used   Substance and Sexual Activity    Alcohol use: Never    Drug use: Not Currently       Marital Status:     Patient's current living situation: Patient lives with spouse  and with children     Support system: two parent,  family, extended family, and patient siblings    Difficulty getting along with peers: no    Difficulty making new friendships: no    Difficulty maintaining friendships: no    Close with family members: yes    Religous: yes    Work History:  Highest level of education obtained: 12th grade    Ever been active duty in the ? 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    Past Medical History:  Past Medical History:   Diagnosis Date    Ectopic pregnancy of left ovary     Substance abuse        Past Surgical History:  Past Surgical History:   Procedure Laterality Date    BREAST AUGMENTATION Bilateral      SECTION      x2    ECTOPIC PREGNANCY SURGERY Left        Physical Exam:   not currently breastfeeding. There is no height or weight on file to calculate BMI.     History of psychiatric treatment or hospitalization: No    Allergy:   No Known Allergies     Current Medications:   Current Outpatient Medications   Medication Sig Dispense Refill    benzoyl peroxide-erythromycin (Benzamycin) 5-3 % gel Apply thin layer over face nightly;  May apply morning as needed when breakouts (Patient not taking: Reported on 2025) 46.6 g 12    buprenorphine-naloxone (SUBOXONE) 8-2 MG per SL tablet Place 1.5 tablets under the tongue Daily. 45 tablet 2    doxycycline (VIBRAMYCIN) 50 MG capsule Take 1 capsule by mouth 2 (Two) Times a Day. (Patient not taking: Reported on  4/1/2025) 90 capsule 1     No current facility-administered medications for this visit.       Family History:  History reviewed. No pertinent family history.    Problem List:  There is no problem list on file for this patient.        History of Substance Use:   Patient answered yes  to experiencing two or more of the following problems related to substance use: using more than intended or over longer period than intended; difficulty quitting or cutting back use; spending a great deal of time obtaining, using, or recovering from using; craving or strong desire or urge to use;  work and/or school problems; financial problems; family problems; using in dangerous situations; physical or mental health problems; relapse; feelings of guilt or remorse about use; times when used and/or drank alone; needing to use more in order to achieve the desired effect; illness or withdrawal when stopping or cutting back use; using to relieve or avoid getting ill or developing withdrawal symptoms; and black outs and/or memory issues when using.      Substance Use:  - Hydrocodone use began in her 20s  - Shared Suboxone prescription with  initially  - No smoking or alcohol use  - Experimented with substances in teenage years without dependency  - Never used intravenous drugs  - Currently on 1.5 Suboxone daily  - No legal issues related to substance use no DUIs arrest or CPS involvement  - Patient indicates no history of intravenous drug use, no history of overdose or use  requiring Narcan administration        PHQ-Score Total:  PHQ-9 Total Score: 1    UDAY-7 Score Total:  Over the last two weeks, how often have you been bothered by the following problems?  Feeling nervous, anxious or on edge: Not at all  Not being able to stop or control worrying: Not at all  Worrying too much about different things: Not at all  Trouble Relaxing: Not at all  Being so restless that it is hard to sit still: Not at all  Becoming easily annoyed or  irritable: Not at all  Feeling afraid as if something awful might happen: Not at all  UDAY 7 Total Score: 0  If you checked any problems, how difficult have these problems made it for you to do your work, take care of things at home, or get along with other people: Not difficult at all    MENTAL STATUS EXAM   General Appearance:  Cleanly groomed and dressed  Eye Contact:  Good eye contact  Attitude:  Cooperative  Motor Activity:  Normal gait, posture  Muscle Strength:  Normal  Speech:  Normal rate, tone, volume  Mood and affect:  Normal, pleasant  Hopelessness:  Denies  Loneliness: Denies  Thought Process:  Logical  Associations/ Thought Content:  No delusions  Hallucinations:  None  Suicidal Ideations:  Not present  Homicidal Ideation:  Not present  Sensorium:  Alert  Orientation:  Person, place, time and situation  Immediate Recall, Recent, and Remote Memory:  Intact  Attention Span/ Concentration:  Good  Fund of Knowledge:  Appropriate for age and educational level  Intellectual Functioning:  Average range  Insight:  Good  Judgement:  Good  Reliability:  Good  Impulse Control:  Good       Impression/Formulation:  Patient appeared alert and oriented.  Patient is voluntarily requesting to begin outpatient therapy at [facility].  Patient is receptive to assistance with maintaining a stable lifestyle.  Patient presents with history of opioid use disorder currently in sustained remission.  She has maintained sobriety from illicit substances for over a decade and is currently stable on med management.  Client's history includes significant family exposure to substance use disorders, which may contribute to ongoing vigilance and maintaining recovery.  She demonstrates insight into her condition and has established protective factors such as supportive family environment stable employment.  Risk factors include a family history of addiction but she has effectively manage these through  treatment adherence and lifestyle adjustments.     Patient is agreeable to attend routine therapy sessions.  Patient expressed desire to maintain stability and participate in the therapeutic process.      Assessment and Plan:     Assessment & Plan  Problems:  - Substance Use Disorder  The patient appears to be stable in her recovery from substance use disorder, with no recent relapses or overdoses reported. She has been on Suboxone for approximately 10-12 years and is currently managing well on a dose of 1.5 mg per day. Her anxiety, which was more pronounced in her early 20s and during pregnancy, has significantly decreased, as evidenced by her zero score on the UDAY-7 assessment. Overall, she seems to be coping well with her mental health and has a supportive network.      Plan:  - Continue current Suboxone regimen (1.5 mg per day).  - Monitor for any signs of relapse or substance-related issues.  - Utilize mindfulness exercises and cognitive-behavioral strategies for stress management.  - Contact the clinic if anxiety symptoms resurface or if any other issues arise.  -Develop comprehensive relapse prevention plan  - Schedule follow-up appointment in July 2025.    Follow-up:  The next appointment is scheduled for July 2025. The goal for the session will be to assess the patient's ongoing stability in her recovery and to address any new concerns that may arise.    Notes & Risk Factors:  - Family history of addiction (father and brother).  - Protective factors include strong support from  and mother.  - No current risk factors for harm to self or others reported.      Visit Diagnoses:    ICD-10-CM ICD-9-CM   1. Opioid dependence in remission  F11.21 304.03        Prognosis: Good with Ongoing Treatment     Return in 3 months (on 8/12/2025) for Next scheduled follow up, Video visit.      Treatment Plan: Continue supportive psychotherapy efforts and medications as indicated. Obtain release of information for  current treatment team for continuity of care as needed. Patient will adhere to medication regimen as prescribed and report any side effects. Patient will contact this office, call 911 or present to the nearest emergency room should suicidal or homicidal ideations occur.    Short Term Goals: Patient will be compliant with medication, and patient will have no significant medication related side effects.  Patient will be engaged in psychotherapy as indicated.  Patient will report subjective improvement of symptoms.    Long Term Goals: To stabilize mood and treat/improve subjective symptoms, the patient will stay out of the hospital, the patient will be at an optimal level of functioning, and the patient will take all medications as prescribed.The patient verbalized understanding and agreement with goals that were mutually set.    Crisis Plan:  If symptoms/behaviors persist, patient will present to the nearest hospital for an assessment. Advised patient of [facility] 24/7 assessment services.            This document has been electronically signed by OMID Hopkins  May 12, 2025 14:04 EDT     Part of this note may be an electronic transcription/translation of spoken language to printed text using the Dragon Dictation System.     Patient or patient representative verbalized consent for the use of Ambient Listening during the visit with  OMID Hopkins for chart documentation. 5/12/2025  14:47 EDT

## 2025-05-12 NOTE — TELEPHONE ENCOUNTER
Pt needs appt to be seen for med. I offered after hours and appts during day and she can't make them. She will figure something else out.

## 2025-07-01 ENCOUNTER — TELEMEDICINE (OUTPATIENT)
Age: 40
End: 2025-07-01
Payer: COMMERCIAL

## 2025-07-01 DIAGNOSIS — F11.21 OPIOID DEPENDENCE IN REMISSION: Primary | ICD-10-CM

## 2025-07-01 RX ORDER — BUPRENORPHINE HYDROCHLORIDE AND NALOXONE HYDROCHLORIDE DIHYDRATE 8; 2 MG/1; MG/1
1.5 TABLET SUBLINGUAL DAILY
Qty: 45 TABLET | Refills: 2 | Status: SHIPPED | OUTPATIENT
Start: 2025-07-01

## 2025-07-01 NOTE — PROGRESS NOTES
Office  Note     Patient Name: Petty Harris  : 1985   MRN: [id number]     Referring Provider: Saran Rios DO    Chief Complaint: Substance use    History of Present Illness:   HPI    Petty Harris is a 40 y.o. female who is here today for follow up related to her opioid use disorder.  She is using her buprenorphine at the same dosage she was 5 years ago.  She has not had any relapses for years. she still not having any craving now..  She sees the therapist in 6 days and after that visit she will set up both of our appointments for 3 months from now which is our standard treatment cycle.    She is still taking her GLP-1 weekly at a 2.5 mg dose for her weight she is leveled off as expected at about 147 to 150 pounds which means she has gained some weight back since she dropped off the higher doses.  This is a good idea because she was too light this past winter.  She fortunately she has changed a lot of her behaviors surrounding her diet her coping skills the kind of food she buys a [employer].  She is eating more of a variety of meats and cheeses including more seafood but she still has a hard time eating vegetables and whole fruit.  She thinks she is eating enough of the fruits and vegetables to get all her vitamins and minerals but she may need to have a metabolic review with her primary care provider.  She does feel like that there is less craving for highly processed carbohydrates that are sweet like cookies.  Before she started using the GLP-1 injection every week she would eat 10 cookies if she ever ate even 1 but now she can just eat one cookie and be done.      Triggers: No triggers for opiates and only an occasional trigger for high fructose corn syrup.    Cravings: No cravings for opiates for years.    Relapse Prevention: She will see the therapist in 6 days and then see both of us again in October.    UDS Confirmation: Negative    LELA (PDMP) Reviewed for Current/Active  Medications      Past Surgical History:  Past Surgical History:   Procedure Laterality Date    BREAST AUGMENTATION Bilateral      SECTION      x2    ECTOPIC PREGNANCY SURGERY Left        Problem List:  There is no problem list on file for this patient.      Allergy:   No Known Allergies     Current Medications:   Current Outpatient Medications   Medication Sig Dispense Refill    buprenorphine-naloxone (SUBOXONE) 8-2 MG per SL tablet Place 1.5 tablets under the tongue Daily. 45 tablet 2    benzoyl peroxide-erythromycin (Benzamycin) 5-3 % gel Apply thin layer over face nightly;  May apply morning as needed when breakouts (Patient not taking: Reported on 2025) 46.6 g 12     No current facility-administered medications for this visit.       Past Medical History:  Past Medical History:   Diagnosis Date    Ectopic pregnancy of left ovary     Substance abuse        Social History:  Social History     Socioeconomic History    Marital status:    Tobacco Use    Smoking status: Never    Smokeless tobacco: Never   Vaping Use    Vaping status: Never Used   Substance and Sexual Activity    Alcohol use: Never    Drug use: Not Currently       Social History     Social History Narrative    /Social History:    Social Support: No    Residence: living setting house with family    Current Employment: yes    Education: High School Diploma    Learning Disabilities: No    Legal history: No    Hobbies/interests: Full time Employed    Samaritan: Muslim     Exercise: No    Dietary issues: No    Sleep issues: No    Caffeine intake: 2 Energy drinks      history: No         Family History:  History reviewed. No pertinent family history.      Subjective      Review of Systems:   Review of Systems    PHQ-9 Score:       UDAY-7 Score:        Patient History:   The following portions of the patient's history were reviewed and updated as appropriate: allergies, current medications, past family history, past medical history,  past social history, past surgical history and problem list.     Social:  Social History     Socioeconomic History    Marital status:    Tobacco Use    Smoking status: Never    Smokeless tobacco: Never   Vaping Use    Vaping status: Never Used   Substance and Sexual Activity    Alcohol use: Never    Drug use: Not Currently       Medications:     Current Outpatient Medications:     buprenorphine-naloxone (SUBOXONE) 8-2 MG per SL tablet, Place 1.5 tablets under the tongue Daily., Disp: 45 tablet, Rfl: 2    benzoyl peroxide-erythromycin (Benzamycin) 5-3 % gel, Apply thin layer over face nightly;  May apply morning as needed when breakouts (Patient not taking: Reported on 4/1/2025), Disp: 46.6 g, Rfl: 12    Objective     Physical Exam:  Physical Exam    Vital Signs:   Vitals:            There is no height or weight on file to calculate BMI.     MENTAL STATUS EXAM   General Appearance:  Cleanly groomed and dressed  Eye Contact:  Good eye contact  Attitude:  Cooperative  Motor Activity:  Normal gait, posture  Muscle Strength:  Normal  Speech:  Normal rate, tone, volume  Language:  Spontaneous  Mood and affect:  Normal, pleasant  Hopelessness:  Denies  Loneliness: Denies  Thought Process:  Logical  Associations/ Thought Content:  No delusions  Hallucinations:  None  Suicidal Ideations:  Not present  Homicidal Ideation:  Not present          Assessment / Plan      Diagnoses and all orders for this visit:    1. Opioid dependence in remission (Primary)  -     buprenorphine-naloxone (SUBOXONE) 8-2 MG per SL tablet; Place 1.5 tablets under the tongue Daily.  Dispense: 45 tablet; Refill: 2           PLAN:  Safety: No acute safety concerns  Risk Assessment: Risk of self-harm acutely is low. Risk of self-harm chronically is also low, but could be further elevated in the event of treatment noncompliance and/or AODA.    TREATMENT PLAN/GOALS: Continue supportive psychotherapy efforts and medications as indicated. Treatment and  medication options discussed during today's visit. Patient acknowledged and verbally consented to continue with current treatment plan and was educated on the importance of compliance with treatment and follow-up appointments.    MEDICATION ISSUES:  LELA reviewed as expected.  Discussed medication options and treatment plan of prescribed medication as well as the risks, benefits, and side effects including potential falls, possible impaired driving and metabolic adversities among others. Patient is agreeable to call the office with any worsening of symptoms or onset of side effects. Patient is agreeable to call 911 or go to the nearest ER should he/she begin having SI/HI. No medication side effects or related complaints today.     No follow-ups on file.             This document has been electronically signed by Slim Ro MD  July 1, 2025 14:30 EDT      Part of this note may be an electronic transcription/translation of spoken language to printed text using the Dragon Dictation System.

## 2025-07-07 ENCOUNTER — TELEMEDICINE (OUTPATIENT)
Age: 40
End: 2025-07-07
Payer: COMMERCIAL

## 2025-07-07 DIAGNOSIS — F11.21 OPIOID DEPENDENCE IN REMISSION: Primary | ICD-10-CM

## 2025-07-07 NOTE — PLAN OF CARE
Discussed care plan with patient today. She is stable in her recovery and will be on maintenance for her MAT.

## 2025-07-07 NOTE — PROGRESS NOTES
[facility]   [address]   [place] [address]  [phone]     Referring physician/provider: No ref. provider found   PCP: Saran Rios DO    Telehealth Encounter  Date of service: 7/7/2025    Time in: 1:58 PM  Time Out: 2:20 PM    Chief Complaint  Substance Use    Mode of Visit: Video   Location of patient: -HOME-   Location of provider: +[facility]+   You have chosen to receive care through a telehealth visit.   The patient has signed the video visit consent form.   The visit included audio and video interaction. No technical issues occurred during this visit.     This provider is located at [facility] using a secure [employer] Video Visit through [employer]. Patient is being seen remotely via telehealth at home address in [place] and stated they are in a secure environment for this session. The patient's condition being diagnosed/treated is appropriate for telemedicine. The provider identified herself as well as her credentials. The patient, and/or patients guardian, consent to be seen remotely, and when consent is given they understand that the consent allows for patient identifiable information to be sent to a third party as needed. They may refuse to be seen remotely at any time. The electronic data is encrypted and password protected, and the patient and/or guardian has been advised of the potential risks to privacy not withstanding such measures.     You have chosen to receive care through a telehealth visit.  Do you consent to use a video/audio connection for your medical care today? [x]  Yes     []  No     Referring Provider: Saran Rios DO    Progress Note       History of Present Illness:   Petty Harris is a 40 y.o. female who is being seen today for follow up individual Psychotherapy session.     History of Present Illness  The patient, a 40-year-old female, presents for a follow-up psychotherapy  appointment.    She remains stable in her recovery and adherent to her medication-assisted treatment (MAT), currently on buprenorphine  (suboxone) 8-2mg. She reports an absence of cravings or urges to use, noting that she has been free of cravings for several years. The initiation of buprenorphine/naloxone combination therapy effectively eradicated any urges to use substances. It has been several years since her last use of illicit substances, and she does not recall her exact sobriety date.  She indicates no mental health concerns.             ICD-10-CM ICD-9-CM   1. Opioid dependence in remission  F11.21 304.03          Clinical Maneuvering/Intervention:   Assisted patient in processing above session content; acknowledged and normalized patient’s thoughts, feelings, and concerns by utilizing a person-centered approach in efforts to build appropriate rapport and a positive therapeutic relationship with open and honest communication.  Processed and rationalized patients thoughts and feelings regarding her long term recovery efforts.  Discussed triggers associated with patient's historical substance use.  Also discussed coping skills for patient to implement such as: Practicing mindfulness meditation daily to manage stress, engaging in regular physical exercise such as walking or yoga to improve mood, and maintaining a balanced diet for overall well-being. Journaling can help process emotions, while deep breathing exercises can reduce anxiety. Connecting with friends, family, or support groups provides social support, and pursuing hobbies like painting or gardening can bring relaxation. Additionally, participation in 12-step programs like Alcoholics Anonymous (AA) or Narcotics Anonymous (NA) is encouraged, along with obtaining a sponsor for guidance and accountability throughout the recovery journey.    Allowed patient to freely discuss issues without interruption or judgment. Provided safe, confidential environment  to facilitate the development of positive therapeutic relationship and encourage open, honest communication. Assisted patient in identifying risk factors which would indicate the need for higher level of care including thoughts to harm self or others and/or self-harming behavior and encouraged patient to contact this office, call 911, or present to the nearest emergency room should any of these events occur. Discussed crisis intervention services and means to access. Patient adamantly and convincingly denies current suicidal or homicidal ideation or perceptual disturbance.    PHQ-Score Total:  PHQ-9 Total Score:      UDAY-7 Score Total:  UDAY-7 Score:                  Mental Status Exam:   Hygiene:   good  Cooperation:  Cooperative  Eye Contact:  Good  Psychomotor Behavior:  Appropriate  Affect:  Appropriate  Mood: normal  Speech:  Normal  Thought Process:  Goal directed  Thought Content:  Normal  Suicidal:  None  Homicidal:  None  Hallucinations:  None  Delusion:  None  Memory:  Intact  Orientation:  Person, Place, Time, and Situation  Reliability:  good  Insight:  Good  Judgement:  Good  Impulse Control:  Good  Physical/Medical Issues:  No      Patient's Support Network Includes:   and children    Functional Status: No impairment    Progress toward goal: At goal, maintenance required for MAT    Prognosis: Good with Ongoing Treatment     Medications:     Current Outpatient Medications:     benzoyl peroxide-erythromycin (Benzamycin) 5-3 % gel, Apply thin layer over face nightly;  May apply morning as needed when breakouts (Patient not taking: Reported on 4/1/2025), Disp: 46.6 g, Rfl: 12    buprenorphine-naloxone (SUBOXONE) 8-2 MG per SL tablet, Place 1.5 tablets under the tongue Daily., Disp: 45 tablet, Rfl: 2    Visit Diagnosis/Orders Placed This Visit:    ICD-10-CM ICD-9-CM   1. Opioid dependence in remission  F11.21 304.03          Assessment and Plan   Diagnoses and all orders for this visit:    1. Opioid  dependence in remission (Primary)        Assessment & Plan  Problems:  - Substance use disorder    Content of Therapy:  - Discussed patient's stability in recovery and compliance with medication-assisted treatment (MAT) using buprenorphine.  - Explored patient's lack of triggers and cravings for opioids.  - Addressed patient's work-life balance and the feeling of time passing quickly.  - Discussed patient's motivation for maintaining sobriety and the role of medication in preventing cravings.  - Reviewed patient's health status, including sleep, depression, and anxiety, all of which were reported as stable.  - Talked about patient's family and daily routine.    Clinical Impression:  The patient is stable in her recovery from substance use disorder and compliant with her MAT regimen, currently on buprenorphine. She reports no triggers or cravings for opioids and has not experienced cravings for years. Her mental health appears stable with no reported issues related to sleep, depression, or anxiety. The patient maintains a busy work schedule and finds motivation in her routine and the effectiveness of her medication.    Therapeutic Intervention:  - Reframing thoughts regarding work-life balance.  - Exploring feelings about recovery and relapse prevention    Plan:  - Schedule follow-up appointment with Dr. Foster.  - Continue current dosage of buprenorphine.  - Maintain regular check-ins every 3 months.    Follow-up:  - Next appointment scheduled for Monday, 10/06/2025 at 2:00 PM.    Notes & Risk Factors:  - None reported.        Safety: No acute safety concerns  Risk Assessment: Risk of self-harm acutely is low. Risk of self-harm chronically is also low, but could be further elevated in the event of treatment noncompliance and/or AODA.    Crisis Plan:  Symptoms and/or behaviors to indicate a crisis: Abuse of substances    What calming techniques or other strategies will patient use to de-escalate and stay safe: slow  down, breathe, visualize calming self, think it though, listen to music, change focus, take a walk    Who is one person patient can contact to assist with de-escalation?  Juan    Treatment Plan/Goals: Patient will continue supportive psychotherapy efforts and medication regimen as prescribed. Therapist will provide Cognitive Behavioral Therapy to assist patient in improving functioning and gaining coping skills, maintaining stability, and avoiding decompensation and the need for higher level of care. Plan for treatment was discussed during today's visit. Patient acknowledged and verbally consented to continue with current treatment plan and was educated on the importance of compliance with treatment and follow-up appointments.     Patient will contact this office, call 911 or present to the nearest emergency room should suicidal or homicidal ideations occur.     Follow Up:   No follow-ups on file.    Patient's questions were answered.  Thank you for allowing me to participate in the care of this patient.  Dictated Utilizing Dragon Dictation. Please note that portions of this note were completed with a voice recognition program. Part of this note may be an electronic transcription/translation of spoken language to printed text using the Dragon Dictation System.      Patient or patient representative verbalized consent for the use of Ambient Listening during the visit with  OMID Hopkins for chart documentation. 7/7/2025  14:26 EDT    [facility]   OMID Hopkins  07/07/25  14:18 EDT

## 2025-07-07 NOTE — TREATMENT PLAN
Multi-Disciplinary Problems (from Behavioral Health Treatment Plan)      Active Problems       Problem: Substance Abuse Issues  Start Date: 07/07/25      Problem Details: Petty has an opioid use disorder in sustained remission on MAT.          Goal Priority Start Date Expected End Date End Date    Patient will abstain from mood altering substances. Critical 07/07/25 01/05/26 --    Goal Details: Progress toward goal:  Not appropriate to rate progress toward goal since this is the initial treatment plan.  Petty will abstain from illicit use of substances. She will attend therapy sessions in conjunction with MAT visits with Dr. Slim Ro, every three months. She will receive regular UDS to monitor compliance with MAT.        Goal Intervention Frequency Start Date End Date    Provide education to increase patients understanding of addiction and relapse processes. Q3 Months 07/07/25 --    Intervention Details: Duration of treatment until discharged.  Petty will engage in individual therapy sessions and develop a relapse prevention plan. She will learn to identify triggers and develop coping skills to manage situations that pose a threat to her long term recovery efforts.She will engage in healthy lifestyle changes including regular exercise, balanced diet and adequate sleep.        Goal Priority Start Date Expected End Date End Date    Patient will improve positive self regard. Medium 07/07/25 01/05/26 --    Goal Details: Progress toward goal:  Not appropriate to rate progress toward goal since this is the initial treatment plan.  Petty will         Goal Intervention Frequency Start Date End Date    Educate patient about how substance use affects their relationships. Q3 Months 07/07/25 --    Intervention Details: Duration of treatment until remission of symptoms.  Identify and Challenge Negative Thoughts:    Intervention: Engage in Cognitive-Behavioral Therapy (CBT) sessions to identify and reframe negative thought  patterns.    Practice Positive Affirmations:    Intervention: Develop a list of positive affirmations and practice them daily.    Identify Personal Strengths:    Intervention: Complete a strengths inventory and reflect on past successes.    Maintain a Gratitude Journal:    Intervention: Write down three things they are grateful for each day.                        Reviewed By       Alexandra Bryson, [facility] 07/07/25 [id number]                     I have discussed and reviewed this treatment plan with the patient.

## 2025-07-23 ENCOUNTER — TRANSCRIBE ORDERS (OUTPATIENT)
Dept: ADMINISTRATIVE | Facility: HOSPITAL | Age: 40
End: 2025-07-23
Payer: COMMERCIAL

## 2025-07-23 DIAGNOSIS — Z12.31 ENCOUNTER FOR SCREENING MAMMOGRAM FOR MALIGNANT NEOPLASM OF BREAST: Primary | ICD-10-CM
